# Patient Record
Sex: FEMALE | URBAN - METROPOLITAN AREA
[De-identification: names, ages, dates, MRNs, and addresses within clinical notes are randomized per-mention and may not be internally consistent; named-entity substitution may affect disease eponyms.]

---

## 2019-12-11 PROBLEM — Z00.00 ENCOUNTER FOR PREVENTIVE HEALTH EXAMINATION: Status: ACTIVE | Noted: 2019-12-11

## 2020-07-20 ENCOUNTER — OUTPATIENT (OUTPATIENT)
Dept: OUTPATIENT SERVICES | Facility: HOSPITAL | Age: 40
LOS: 1 days | Discharge: HOME | End: 2020-07-20
Payer: COMMERCIAL

## 2020-07-20 ENCOUNTER — OUTPATIENT (OUTPATIENT)
Dept: OUTPATIENT SERVICES | Facility: HOSPITAL | Age: 40
LOS: 1 days | Discharge: HOME | End: 2020-07-20

## 2020-07-20 VITALS
OXYGEN SATURATION: 100 % | HEIGHT: 66 IN | WEIGHT: 132.06 LBS | RESPIRATION RATE: 16 BRPM | TEMPERATURE: 97 F | DIASTOLIC BLOOD PRESSURE: 78 MMHG | HEART RATE: 68 BPM | SYSTOLIC BLOOD PRESSURE: 132 MMHG

## 2020-07-20 DIAGNOSIS — Z98.890 OTHER SPECIFIED POSTPROCEDURAL STATES: Chronic | ICD-10-CM

## 2020-07-20 DIAGNOSIS — D25.9 LEIOMYOMA OF UTERUS, UNSPECIFIED: ICD-10-CM

## 2020-07-20 DIAGNOSIS — Z01.818 ENCOUNTER FOR OTHER PREPROCEDURAL EXAMINATION: ICD-10-CM

## 2020-07-20 DIAGNOSIS — Z11.59 ENCOUNTER FOR SCREENING FOR OTHER VIRAL DISEASES: ICD-10-CM

## 2020-07-20 LAB
ALBUMIN SERPL ELPH-MCNC: 4.9 G/DL — SIGNIFICANT CHANGE UP (ref 3.5–5.2)
ALP SERPL-CCNC: 56 U/L — SIGNIFICANT CHANGE UP (ref 30–115)
ALT FLD-CCNC: 11 U/L — SIGNIFICANT CHANGE UP (ref 0–41)
ANION GAP SERPL CALC-SCNC: 15 MMOL/L — HIGH (ref 7–14)
APPEARANCE UR: CLEAR — SIGNIFICANT CHANGE UP
APTT BLD: 28.6 SEC — SIGNIFICANT CHANGE UP (ref 27–39.2)
AST SERPL-CCNC: 15 U/L — SIGNIFICANT CHANGE UP (ref 0–41)
BASOPHILS # BLD AUTO: 0.13 K/UL — SIGNIFICANT CHANGE UP (ref 0–0.2)
BASOPHILS NFR BLD AUTO: 2 % — HIGH (ref 0–1)
BILIRUB SERPL-MCNC: 0.3 MG/DL — SIGNIFICANT CHANGE UP (ref 0.2–1.2)
BILIRUB UR-MCNC: NEGATIVE — SIGNIFICANT CHANGE UP
BUN SERPL-MCNC: 10 MG/DL — SIGNIFICANT CHANGE UP (ref 10–20)
CALCIUM SERPL-MCNC: 9.8 MG/DL — SIGNIFICANT CHANGE UP (ref 8.5–10.1)
CHLORIDE SERPL-SCNC: 98 MMOL/L — SIGNIFICANT CHANGE UP (ref 98–110)
CO2 SERPL-SCNC: 27 MMOL/L — SIGNIFICANT CHANGE UP (ref 17–32)
COLOR SPEC: SIGNIFICANT CHANGE UP
CREAT SERPL-MCNC: 0.8 MG/DL — SIGNIFICANT CHANGE UP (ref 0.7–1.5)
DIFF PNL FLD: NEGATIVE — SIGNIFICANT CHANGE UP
EOSINOPHIL # BLD AUTO: 0.17 K/UL — SIGNIFICANT CHANGE UP (ref 0–0.7)
EOSINOPHIL NFR BLD AUTO: 2.6 % — SIGNIFICANT CHANGE UP (ref 0–8)
GLUCOSE SERPL-MCNC: 95 MG/DL — SIGNIFICANT CHANGE UP (ref 70–99)
GLUCOSE UR QL: NEGATIVE — SIGNIFICANT CHANGE UP
HCT VFR BLD CALC: 42.6 % — SIGNIFICANT CHANGE UP (ref 37–47)
HGB BLD-MCNC: 14 G/DL — SIGNIFICANT CHANGE UP (ref 12–16)
IMM GRANULOCYTES NFR BLD AUTO: 0.3 % — SIGNIFICANT CHANGE UP (ref 0.1–0.3)
INR BLD: 0.95 RATIO — SIGNIFICANT CHANGE UP (ref 0.65–1.3)
KETONES UR-MCNC: NEGATIVE — SIGNIFICANT CHANGE UP
LEUKOCYTE ESTERASE UR-ACNC: NEGATIVE — SIGNIFICANT CHANGE UP
LYMPHOCYTES # BLD AUTO: 2.21 K/UL — SIGNIFICANT CHANGE UP (ref 1.2–3.4)
LYMPHOCYTES # BLD AUTO: 33.2 % — SIGNIFICANT CHANGE UP (ref 20.5–51.1)
MCHC RBC-ENTMCNC: 29.4 PG — SIGNIFICANT CHANGE UP (ref 27–31)
MCHC RBC-ENTMCNC: 32.9 G/DL — SIGNIFICANT CHANGE UP (ref 32–37)
MCV RBC AUTO: 89.3 FL — SIGNIFICANT CHANGE UP (ref 81–99)
MONOCYTES # BLD AUTO: 0.65 K/UL — HIGH (ref 0.1–0.6)
MONOCYTES NFR BLD AUTO: 9.8 % — HIGH (ref 1.7–9.3)
NEUTROPHILS # BLD AUTO: 3.47 K/UL — SIGNIFICANT CHANGE UP (ref 1.4–6.5)
NEUTROPHILS NFR BLD AUTO: 52.1 % — SIGNIFICANT CHANGE UP (ref 42.2–75.2)
NITRITE UR-MCNC: NEGATIVE — SIGNIFICANT CHANGE UP
NRBC # BLD: 0 /100 WBCS — SIGNIFICANT CHANGE UP (ref 0–0)
PH UR: 6 — SIGNIFICANT CHANGE UP (ref 5–8)
PLATELET # BLD AUTO: 383 K/UL — SIGNIFICANT CHANGE UP (ref 130–400)
POTASSIUM SERPL-MCNC: 4.2 MMOL/L — SIGNIFICANT CHANGE UP (ref 3.5–5)
POTASSIUM SERPL-SCNC: 4.2 MMOL/L — SIGNIFICANT CHANGE UP (ref 3.5–5)
PROT SERPL-MCNC: 7.5 G/DL — SIGNIFICANT CHANGE UP (ref 6–8)
PROT UR-MCNC: NEGATIVE — SIGNIFICANT CHANGE UP
PROTHROM AB SERPL-ACNC: 10.9 SEC — SIGNIFICANT CHANGE UP (ref 9.95–12.87)
RBC # BLD: 4.77 M/UL — SIGNIFICANT CHANGE UP (ref 4.2–5.4)
RBC # FLD: 11.9 % — SIGNIFICANT CHANGE UP (ref 11.5–14.5)
SODIUM SERPL-SCNC: 140 MMOL/L — SIGNIFICANT CHANGE UP (ref 135–146)
SP GR SPEC: 1.02 — SIGNIFICANT CHANGE UP (ref 1.01–1.02)
UROBILINOGEN FLD QL: SIGNIFICANT CHANGE UP
WBC # BLD: 6.65 K/UL — SIGNIFICANT CHANGE UP (ref 4.8–10.8)
WBC # FLD AUTO: 6.65 K/UL — SIGNIFICANT CHANGE UP (ref 4.8–10.8)

## 2020-07-20 PROCEDURE — 93010 ELECTROCARDIOGRAM REPORT: CPT

## 2020-07-20 NOTE — H&P PST ADULT - NSANTHOSAYNRD_GEN_A_CORE
No. MIHAI screening performed.  STOP BANG Legend: 0-2 = LOW Risk; 3-4 = INTERMEDIATE Risk; 5-8 = HIGH Risk

## 2020-07-20 NOTE — H&P PST ADULT - NSICDXPASTMEDICALHX_GEN_ALL_CORE_FT
PAST MEDICAL HISTORY:  Hypothyroidism due to Hashimoto's thyroiditis 2 yr stable dose > 1 yr    Uterine myoma 5 yr

## 2020-07-20 NOTE — H&P PST ADULT - HISTORY OF PRESENT ILLNESS
39 yr old female to past for abdominal myomectomy possible hysterectomy h/o fibroid uterus  5 yr increase in size no pain menses heavy but regular [id number]   Pt reports no cardiopulmonary issues denies sob/[place]/cp/palpitations. Pt states no recent infections no fever no cough no uti uri. Stated exercise tolerance is 2-3  flights no changes. Michael screen revd.  Pt denies any s/s covid 19 and reports no contact with known postive people. Pt has appointment for repeat covid testing pre op and instructed to continue to self monitor and report any concerns to MD. Pt will continue to practice social distancing and exposure control measures pre op  Anesthesia Alert  NO--Difficult Airway  NO--History of neck surgery or radiation  NO--Limited ROM of neck  NO--History of Malignant hyperthermia  NO--No personal or family history of Pseudocholinesterase deficiency.  NO--Prior Anesthesia Complication  NO--Latex Allergy  NO--Loose teeth  NO--History of Rheumatoid Arthritis  NO--MICHAEL  NO--Other_____   no fam h/o ane issues or bleeding disorders   pmh hypothyroid hashimotos  2 yrs stable dose 1 yr appears clinically euthyroid   psh gyn laparoscopy  2016

## 2020-07-21 LAB
T3 SERPL-MCNC: 78 NG/DL — LOW (ref 80–200)
T4 AB SER-ACNC: 8.1 UG/DL — SIGNIFICANT CHANGE UP (ref 4.6–12)
TSH SERPL-MCNC: 1.75 UIU/ML — SIGNIFICANT CHANGE UP (ref 0.27–4.2)

## 2020-07-23 ENCOUNTER — INPATIENT (INPATIENT)
Facility: HOSPITAL | Age: 40
LOS: 1 days | Discharge: HOME | End: 2020-07-25
Attending: OBSTETRICS & GYNECOLOGY | Admitting: OBSTETRICS & GYNECOLOGY
Payer: COMMERCIAL

## 2020-07-23 VITALS
TEMPERATURE: 98 F | HEIGHT: 66 IN | DIASTOLIC BLOOD PRESSURE: 70 MMHG | WEIGHT: 134.04 LBS | RESPIRATION RATE: 17 BRPM | OXYGEN SATURATION: 99 % | SYSTOLIC BLOOD PRESSURE: 110 MMHG | HEART RATE: 69 BPM

## 2020-07-23 DIAGNOSIS — Z98.890 OTHER SPECIFIED POSTPROCEDURAL STATES: Chronic | ICD-10-CM

## 2020-07-23 DIAGNOSIS — Z90.79 ACQUIRED ABSENCE OF OTHER GENITAL ORGAN(S): Chronic | ICD-10-CM

## 2020-07-23 DIAGNOSIS — O00.201 RIGHT OVARIAN PREGNANCY WITHOUT INTRAUTERINE PREGNANCY: Chronic | ICD-10-CM

## 2020-07-23 LAB
ABO RH CONFIRMATION: SIGNIFICANT CHANGE UP
ALBUMIN SERPL ELPH-MCNC: 4 G/DL — SIGNIFICANT CHANGE UP (ref 3.5–5.2)
ALP SERPL-CCNC: 36 U/L — SIGNIFICANT CHANGE UP (ref 30–115)
ALT FLD-CCNC: 9 U/L — SIGNIFICANT CHANGE UP (ref 0–41)
ANION GAP SERPL CALC-SCNC: 12 MMOL/L — SIGNIFICANT CHANGE UP (ref 7–14)
AST SERPL-CCNC: 15 U/L — SIGNIFICANT CHANGE UP (ref 0–41)
BASOPHILS # BLD AUTO: 0.05 K/UL — SIGNIFICANT CHANGE UP (ref 0–0.2)
BASOPHILS NFR BLD AUTO: 0.3 % — SIGNIFICANT CHANGE UP (ref 0–1)
BILIRUB SERPL-MCNC: 0.5 MG/DL — SIGNIFICANT CHANGE UP (ref 0.2–1.2)
BLD GP AB SCN SERPL QL: SIGNIFICANT CHANGE UP
BUN SERPL-MCNC: 5 MG/DL — LOW (ref 10–20)
CALCIUM SERPL-MCNC: 9.1 MG/DL — SIGNIFICANT CHANGE UP (ref 8.5–10.1)
CHLORIDE SERPL-SCNC: 101 MMOL/L — SIGNIFICANT CHANGE UP (ref 98–110)
CO2 SERPL-SCNC: 25 MMOL/L — SIGNIFICANT CHANGE UP (ref 17–32)
CREAT SERPL-MCNC: 0.6 MG/DL — LOW (ref 0.7–1.5)
EOSINOPHIL # BLD AUTO: 0 K/UL — SIGNIFICANT CHANGE UP (ref 0–0.7)
EOSINOPHIL NFR BLD AUTO: 0 % — SIGNIFICANT CHANGE UP (ref 0–8)
GLUCOSE BLDC GLUCOMTR-MCNC: 100 MG/DL — HIGH (ref 70–99)
GLUCOSE SERPL-MCNC: 151 MG/DL — HIGH (ref 70–99)
HCT VFR BLD CALC: 35.1 % — LOW (ref 37–47)
HGB BLD-MCNC: 11.6 G/DL — LOW (ref 12–16)
IMM GRANULOCYTES NFR BLD AUTO: 0.6 % — HIGH (ref 0.1–0.3)
LYMPHOCYTES # BLD AUTO: 0.68 K/UL — LOW (ref 1.2–3.4)
LYMPHOCYTES # BLD AUTO: 3.7 % — LOW (ref 20.5–51.1)
MAGNESIUM SERPL-MCNC: 1.9 MG/DL — SIGNIFICANT CHANGE UP (ref 1.8–2.4)
MCHC RBC-ENTMCNC: 29.4 PG — SIGNIFICANT CHANGE UP (ref 27–31)
MCHC RBC-ENTMCNC: 33 G/DL — SIGNIFICANT CHANGE UP (ref 32–37)
MCV RBC AUTO: 88.9 FL — SIGNIFICANT CHANGE UP (ref 81–99)
MONOCYTES # BLD AUTO: 1.19 K/UL — HIGH (ref 0.1–0.6)
MONOCYTES NFR BLD AUTO: 6.4 % — SIGNIFICANT CHANGE UP (ref 1.7–9.3)
NEUTROPHILS # BLD AUTO: 16.52 K/UL — HIGH (ref 1.4–6.5)
NEUTROPHILS NFR BLD AUTO: 89 % — HIGH (ref 42.2–75.2)
NRBC # BLD: 0 /100 WBCS — SIGNIFICANT CHANGE UP (ref 0–0)
PHOSPHATE SERPL-MCNC: 3.8 MG/DL — SIGNIFICANT CHANGE UP (ref 2.1–4.9)
PLATELET # BLD AUTO: 313 K/UL — SIGNIFICANT CHANGE UP (ref 130–400)
POTASSIUM SERPL-MCNC: 4.5 MMOL/L — SIGNIFICANT CHANGE UP (ref 3.5–5)
POTASSIUM SERPL-SCNC: 4.5 MMOL/L — SIGNIFICANT CHANGE UP (ref 3.5–5)
PROT SERPL-MCNC: 6.1 G/DL — SIGNIFICANT CHANGE UP (ref 6–8)
RBC # BLD: 3.95 M/UL — LOW (ref 4.2–5.4)
RBC # FLD: 11.9 % — SIGNIFICANT CHANGE UP (ref 11.5–14.5)
SODIUM SERPL-SCNC: 138 MMOL/L — SIGNIFICANT CHANGE UP (ref 135–146)
WBC # BLD: 18.55 K/UL — HIGH (ref 4.8–10.8)
WBC # FLD AUTO: 18.55 K/UL — HIGH (ref 4.8–10.8)

## 2020-07-23 PROCEDURE — 88305 TISSUE EXAM BY PATHOLOGIST: CPT | Mod: 26,59

## 2020-07-23 RX ORDER — ONDANSETRON 8 MG/1
4 TABLET, FILM COATED ORAL ONCE
Refills: 0 | Status: DISCONTINUED | OUTPATIENT
Start: 2020-07-23 | End: 2020-07-25

## 2020-07-23 RX ORDER — SODIUM CHLORIDE 9 MG/ML
1000 INJECTION, SOLUTION INTRAVENOUS
Refills: 0 | Status: DISCONTINUED | OUTPATIENT
Start: 2020-07-23 | End: 2020-07-23

## 2020-07-23 RX ORDER — HYDROMORPHONE HYDROCHLORIDE 2 MG/ML
0.5 INJECTION INTRAMUSCULAR; INTRAVENOUS; SUBCUTANEOUS
Refills: 0 | Status: DISCONTINUED | OUTPATIENT
Start: 2020-07-23 | End: 2020-07-23

## 2020-07-23 RX ORDER — ONDANSETRON 8 MG/1
4 TABLET, FILM COATED ORAL ONCE
Refills: 0 | Status: COMPLETED | OUTPATIENT
Start: 2020-07-23 | End: 2020-07-23

## 2020-07-23 RX ORDER — KETOROLAC TROMETHAMINE 30 MG/ML
15 SYRINGE (ML) INJECTION EVERY 6 HOURS
Refills: 0 | Status: DISCONTINUED | OUTPATIENT
Start: 2020-07-23 | End: 2020-07-24

## 2020-07-23 RX ORDER — MORPHINE SULFATE 50 MG/1
2 CAPSULE, EXTENDED RELEASE ORAL
Refills: 0 | Status: DISCONTINUED | OUTPATIENT
Start: 2020-07-23 | End: 2020-07-23

## 2020-07-23 RX ORDER — SIMETHICONE 80 MG/1
80 TABLET, CHEWABLE ORAL EVERY 4 HOURS
Refills: 0 | Status: DISCONTINUED | OUTPATIENT
Start: 2020-07-23 | End: 2020-07-25

## 2020-07-23 RX ORDER — LEVOTHYROXINE SODIUM 125 MCG
75 TABLET ORAL DAILY
Refills: 0 | Status: DISCONTINUED | OUTPATIENT
Start: 2020-07-23 | End: 2020-07-25

## 2020-07-23 RX ORDER — OXYCODONE HYDROCHLORIDE 5 MG/1
5 TABLET ORAL EVERY 4 HOURS
Refills: 0 | Status: DISCONTINUED | OUTPATIENT
Start: 2020-07-23 | End: 2020-07-25

## 2020-07-23 RX ORDER — IBUPROFEN 200 MG
600 TABLET ORAL EVERY 6 HOURS
Refills: 0 | Status: DISCONTINUED | OUTPATIENT
Start: 2020-07-24 | End: 2020-07-25

## 2020-07-23 RX ORDER — ACETAMINOPHEN 500 MG
1000 TABLET ORAL ONCE
Refills: 0 | Status: COMPLETED | OUTPATIENT
Start: 2020-07-23 | End: 2020-07-23

## 2020-07-23 RX ORDER — ACETAMINOPHEN 500 MG
975 TABLET ORAL EVERY 6 HOURS
Refills: 0 | Status: DISCONTINUED | OUTPATIENT
Start: 2020-07-23 | End: 2020-07-25

## 2020-07-23 RX ORDER — ENOXAPARIN SODIUM 100 MG/ML
40 INJECTION SUBCUTANEOUS AT BEDTIME
Refills: 0 | Status: DISCONTINUED | OUTPATIENT
Start: 2020-07-23 | End: 2020-07-25

## 2020-07-23 RX ADMIN — Medication 15 MILLIGRAM(S): at 23:07

## 2020-07-23 RX ADMIN — OXYCODONE HYDROCHLORIDE 5 MILLIGRAM(S): 5 TABLET ORAL at 18:45

## 2020-07-23 RX ADMIN — SIMETHICONE 80 MILLIGRAM(S): 80 TABLET, CHEWABLE ORAL at 17:29

## 2020-07-23 RX ADMIN — Medication 15 MILLIGRAM(S): at 13:08

## 2020-07-23 RX ADMIN — SIMETHICONE 80 MILLIGRAM(S): 80 TABLET, CHEWABLE ORAL at 13:53

## 2020-07-23 RX ADMIN — Medication 15 MILLIGRAM(S): at 23:14

## 2020-07-23 RX ADMIN — Medication 400 MILLIGRAM(S): at 11:44

## 2020-07-23 RX ADMIN — Medication 5 MILLIGRAM(S): at 21:40

## 2020-07-23 RX ADMIN — Medication 975 MILLIGRAM(S): at 23:13

## 2020-07-23 RX ADMIN — HYDROMORPHONE HYDROCHLORIDE 0.5 MILLIGRAM(S): 2 INJECTION INTRAMUSCULAR; INTRAVENOUS; SUBCUTANEOUS at 11:04

## 2020-07-23 RX ADMIN — OXYCODONE HYDROCHLORIDE 5 MILLIGRAM(S): 5 TABLET ORAL at 13:53

## 2020-07-23 RX ADMIN — Medication 1000 MILLIGRAM(S): at 12:00

## 2020-07-23 RX ADMIN — Medication 600 MILLIGRAM(S): at 23:07

## 2020-07-23 RX ADMIN — Medication 975 MILLIGRAM(S): at 23:07

## 2020-07-23 RX ADMIN — Medication 15 MILLIGRAM(S): at 17:29

## 2020-07-23 RX ADMIN — Medication 600 MILLIGRAM(S): at 23:13

## 2020-07-23 RX ADMIN — Medication 975 MILLIGRAM(S): at 17:29

## 2020-07-23 RX ADMIN — ONDANSETRON 4 MILLIGRAM(S): 8 TABLET, FILM COATED ORAL at 10:58

## 2020-07-23 RX ADMIN — SODIUM CHLORIDE 75 MILLILITER(S): 9 INJECTION, SOLUTION INTRAVENOUS at 10:46

## 2020-07-23 RX ADMIN — SODIUM CHLORIDE 75 MILLILITER(S): 9 INJECTION, SOLUTION INTRAVENOUS at 13:08

## 2020-07-23 RX ADMIN — SIMETHICONE 80 MILLIGRAM(S): 80 TABLET, CHEWABLE ORAL at 21:40

## 2020-07-23 RX ADMIN — HYDROMORPHONE HYDROCHLORIDE 0.5 MILLIGRAM(S): 2 INJECTION INTRAMUSCULAR; INTRAVENOUS; SUBCUTANEOUS at 10:49

## 2020-07-23 RX ADMIN — ENOXAPARIN SODIUM 40 MILLIGRAM(S): 100 INJECTION SUBCUTANEOUS at 21:40

## 2020-07-23 NOTE — ASU PREOP CHECKLIST - SELECT TESTS ORDERED
BMP/CBC/PT/PTT/UCG/EKG BMP/CBC/PT/PTT/Urinalysis/UCG/EKG urine pregnancy negative/BMP/CBC/PT/PTT/Urinalysis/UCG/EKG urine pregnancy negative/BMP/CBC/PT/PTT/Type and Cross/Type and Screen/Urinalysis/UCG/EKG

## 2020-07-23 NOTE — BRIEF OPERATIVE NOTE - OPERATION/FINDINGS
10 week size uterus, 10cm subserosal fibroid, 2cm pedunculated fibroid, normal bilateral ovaries, surgically absent left fallopian tube, normal right fallopian tube

## 2020-07-23 NOTE — PROGRESS NOTE ADULT - SUBJECTIVE AND OBJECTIVE BOX
PGY 2 Note    Patient examined at bedside. Pain  2/10 on lower abdomen. Denies fevers/chills, HA/N/V, CP/SOB/palpitations, vaginal bleeding, hematuria/dysuria, constipation/diarrhea. Tolerating regular diet, no flatus yet. Not Ambulating yet.     T(F): 98.8 (20 @ 17:04), Max: 98.9 (20 @ 10:35)  HR: 101 (20 @ 17:04) (63 - 101)  BP: 89/54 (20 @ 17:04) (89/54 - 110/70)  RR: 18 (20 @ 17:04) (11 - 20)  SpO2: 96% (20 @ 13:15) (96% - 100%)    UO 900cc ([id number])      POCT Blood Glucose.: 100 mg/dL (2020 05:56)      Physical Exam:  General: AAOx3. NAD  CVS: RRR. Nl S1S2  Lungs: CTAB  Abdomen: soft, mildly tender on bilateral lower quadrants, non-distended, +BSx4  Incision: Dressing in place, no saturation.   VE: deferred, no bleeding on pad/chux  Ext: No edema. SCDs in place    Labs:             14.0   6.65  )-----------( 383      (  @ 18:48 )             42.6             Trend:             14.0   6.65  )-----------( 383      (  @ 18:48 )             42.6         Creatinine, Serum: 0.8 ()      Medications:  MEDICATIONS  (STANDING):  acetaminophen   Tablet .. 975 milliGRAM(s) Oral every 6 hours  bisacodyl 5 milliGRAM(s) Oral at bedtime  ketorolac   Injectable 15 milliGRAM(s) IV Push every 6 hours  levothyroxine 75 MICROGram(s) Oral daily  simethicone 80 milliGRAM(s) Chew every 4 hours    MEDICATIONS  (PRN):  ondansetron Injectable 4 milliGRAM(s) IV Push once PRN Nausea and/or Vomiting  oxyCODONE    IR 5 milliGRAM(s) Oral every 4 hours PRN Severe Pain (7 - 10)      A/P:  38yo , s/p abdominal myomectomy for known large fibroid, EBL 300cc, h/o Hashimoto's thyroiditis on Synthroid, recovering well  POD 0  Neuro: AAOx3, NAD, ERAS protocol for pain management  Cardio: vital signs ordered q4hr  Pulm: Incentive spirometry ordered  ID: f/u postop labs @1600 and tomorrow AM  Endo: Synthroid 75mcg QD ordered  GI: regular diet, Zofran ordered PRN  DVT prophylaxis: SCDs in place, Lovenox tonight if 1600 labs stable    Dr. Bustos and Dr. Monterroso aware PGY 2 Note    Patient examined at bedside. Pain  2/10 on lower abdomen. Denies fevers/chills, HA/N/V, CP/SOB/palpitations, vaginal bleeding, hematuria/dysuria, constipation/diarrhea. Tolerating regular diet, no flatus yet. Not Ambulating yet.     T(F): 98.8 (20 @ 17:04), Max: 98.9 (20 @ 10:35)  HR: 101 (20 @ 17:04) (63 - 101)  BP: 89/54 (20 @ 17:04) (89/54 - 110/70)  RR: 18 (20 @ 17:04) (11 - 20)  SpO2: 96% (20 @ 13:15) (96% - 100%)    UO 900cc ([id number])      POCT Blood Glucose.: 100 mg/dL (2020 05:56)      Physical Exam:  General: AAOx3. NAD  CVS: RRR. Nl S1S2  Lungs: CTAB  Abdomen: soft, mildly tender on bilateral lower quadrants, non-distended, +BSx4  Incision: Dressing in place, no saturation.   VE: deferred, no bleeding on pad/chux  Ext: No edema. SCDs in place    Labs:             14.0   6.65  )-----------( 383      (  @ 18:48 )             42.6   (Preop)      Creatinine, Serum: 0.8 ()      Medications:  MEDICATIONS  (STANDING):  acetaminophen   Tablet .. 975 milliGRAM(s) Oral every 6 hours  bisacodyl 5 milliGRAM(s) Oral at bedtime  ketorolac   Injectable 15 milliGRAM(s) IV Push every 6 hours  levothyroxine 75 MICROGram(s) Oral daily  simethicone 80 milliGRAM(s) Chew every 4 hours    MEDICATIONS  (PRN):  ondansetron Injectable 4 milliGRAM(s) IV Push once PRN Nausea and/or Vomiting  oxyCODONE    IR 5 milliGRAM(s) Oral every 4 hours PRN Severe Pain (7 - 10)      A/P:  38yo , s/p abdominal myomectomy for known large fibroid, EBL 300cc, h/o Hashimoto's thyroiditis on Synthroid, recovering well  POD 0  Neuro: AAOx3, NAD, ERAS protocol for pain management  Cardio: vital signs ordered q4hr  Pulm: Incentive spirometry ordered  ID: f/u postop labs @1600 and tomorrow AM  Endo: Synthroid 75mcg QD ordered  GI: regular diet, Zofran ordered PRN  DVT prophylaxis: SCDs in place, Lovenox tonight if 1600 labs stable    Dr. Bustos and Dr. Monterroso aware

## 2020-07-24 LAB
ALBUMIN SERPL ELPH-MCNC: 3.5 G/DL — SIGNIFICANT CHANGE UP (ref 3.5–5.2)
ALP SERPL-CCNC: 33 U/L — SIGNIFICANT CHANGE UP (ref 30–115)
ALT FLD-CCNC: 9 U/L — SIGNIFICANT CHANGE UP (ref 0–41)
ANION GAP SERPL CALC-SCNC: 11 MMOL/L — SIGNIFICANT CHANGE UP (ref 7–14)
AST SERPL-CCNC: 14 U/L — SIGNIFICANT CHANGE UP (ref 0–41)
BASOPHILS # BLD AUTO: 0.09 K/UL — SIGNIFICANT CHANGE UP (ref 0–0.2)
BASOPHILS NFR BLD AUTO: 0.8 % — SIGNIFICANT CHANGE UP (ref 0–1)
BILIRUB SERPL-MCNC: 0.6 MG/DL — SIGNIFICANT CHANGE UP (ref 0.2–1.2)
BUN SERPL-MCNC: 5 MG/DL — LOW (ref 10–20)
CALCIUM SERPL-MCNC: 8.7 MG/DL — SIGNIFICANT CHANGE UP (ref 8.5–10.1)
CHLORIDE SERPL-SCNC: 105 MMOL/L — SIGNIFICANT CHANGE UP (ref 98–110)
CO2 SERPL-SCNC: 24 MMOL/L — SIGNIFICANT CHANGE UP (ref 17–32)
CREAT SERPL-MCNC: 0.6 MG/DL — LOW (ref 0.7–1.5)
EOSINOPHIL # BLD AUTO: 0.05 K/UL — SIGNIFICANT CHANGE UP (ref 0–0.7)
EOSINOPHIL NFR BLD AUTO: 0.5 % — SIGNIFICANT CHANGE UP (ref 0–8)
GLUCOSE SERPL-MCNC: 95 MG/DL — SIGNIFICANT CHANGE UP (ref 70–99)
HCT VFR BLD CALC: 30.7 % — LOW (ref 37–47)
HGB BLD-MCNC: 10.5 G/DL — LOW (ref 12–16)
IMM GRANULOCYTES NFR BLD AUTO: 0.3 % — SIGNIFICANT CHANGE UP (ref 0.1–0.3)
LYMPHOCYTES # BLD AUTO: 19.8 % — LOW (ref 20.5–51.1)
LYMPHOCYTES # BLD AUTO: 2.16 K/UL — SIGNIFICANT CHANGE UP (ref 1.2–3.4)
MAGNESIUM SERPL-MCNC: 2 MG/DL — SIGNIFICANT CHANGE UP (ref 1.8–2.4)
MCHC RBC-ENTMCNC: 31.8 PG — HIGH (ref 27–31)
MCHC RBC-ENTMCNC: 34.2 G/DL — SIGNIFICANT CHANGE UP (ref 32–37)
MCV RBC AUTO: 93 FL — SIGNIFICANT CHANGE UP (ref 81–99)
MONOCYTES # BLD AUTO: 1.58 K/UL — HIGH (ref 0.1–0.6)
MONOCYTES NFR BLD AUTO: 14.5 % — HIGH (ref 1.7–9.3)
NEUTROPHILS # BLD AUTO: 6.99 K/UL — HIGH (ref 1.4–6.5)
NEUTROPHILS NFR BLD AUTO: 64.1 % — SIGNIFICANT CHANGE UP (ref 42.2–75.2)
NRBC # BLD: 0 /100 WBCS — SIGNIFICANT CHANGE UP (ref 0–0)
PHOSPHATE SERPL-MCNC: 3.1 MG/DL — SIGNIFICANT CHANGE UP (ref 2.1–4.9)
PLATELET # BLD AUTO: 252 K/UL — SIGNIFICANT CHANGE UP (ref 130–400)
POTASSIUM SERPL-MCNC: 4 MMOL/L — SIGNIFICANT CHANGE UP (ref 3.5–5)
POTASSIUM SERPL-SCNC: 4 MMOL/L — SIGNIFICANT CHANGE UP (ref 3.5–5)
PROT SERPL-MCNC: 5.6 G/DL — LOW (ref 6–8)
RBC # BLD: 3.3 M/UL — LOW (ref 4.2–5.4)
RBC # FLD: 12.4 % — SIGNIFICANT CHANGE UP (ref 11.5–14.5)
SODIUM SERPL-SCNC: 140 MMOL/L — SIGNIFICANT CHANGE UP (ref 135–146)
WBC # BLD: 10.9 K/UL — HIGH (ref 4.8–10.8)
WBC # FLD AUTO: 10.9 K/UL — HIGH (ref 4.8–10.8)

## 2020-07-24 RX ORDER — SODIUM CHLORIDE 9 MG/ML
500 INJECTION, SOLUTION INTRAVENOUS ONCE
Refills: 0 | Status: COMPLETED | OUTPATIENT
Start: 2020-07-24 | End: 2020-07-24

## 2020-07-24 RX ADMIN — SIMETHICONE 80 MILLIGRAM(S): 80 TABLET, CHEWABLE ORAL at 17:26

## 2020-07-24 RX ADMIN — Medication 600 MILLIGRAM(S): at 05:33

## 2020-07-24 RX ADMIN — Medication 15 MILLIGRAM(S): at 05:33

## 2020-07-24 RX ADMIN — Medication 975 MILLIGRAM(S): at 23:37

## 2020-07-24 RX ADMIN — Medication 600 MILLIGRAM(S): at 23:36

## 2020-07-24 RX ADMIN — Medication 600 MILLIGRAM(S): at 12:39

## 2020-07-24 RX ADMIN — Medication 5 MILLIGRAM(S): at 21:07

## 2020-07-24 RX ADMIN — Medication 975 MILLIGRAM(S): at 18:46

## 2020-07-24 RX ADMIN — Medication 600 MILLIGRAM(S): at 18:47

## 2020-07-24 RX ADMIN — Medication 975 MILLIGRAM(S): at 17:26

## 2020-07-24 RX ADMIN — Medication 75 MICROGRAM(S): at 05:31

## 2020-07-24 RX ADMIN — Medication 600 MILLIGRAM(S): at 05:31

## 2020-07-24 RX ADMIN — SIMETHICONE 80 MILLIGRAM(S): 80 TABLET, CHEWABLE ORAL at 02:10

## 2020-07-24 RX ADMIN — Medication 975 MILLIGRAM(S): at 05:33

## 2020-07-24 RX ADMIN — Medication 975 MILLIGRAM(S): at 12:28

## 2020-07-24 RX ADMIN — Medication 600 MILLIGRAM(S): at 12:29

## 2020-07-24 RX ADMIN — Medication 975 MILLIGRAM(S): at 05:31

## 2020-07-24 RX ADMIN — SIMETHICONE 80 MILLIGRAM(S): 80 TABLET, CHEWABLE ORAL at 14:15

## 2020-07-24 RX ADMIN — SIMETHICONE 80 MILLIGRAM(S): 80 TABLET, CHEWABLE ORAL at 21:07

## 2020-07-24 RX ADMIN — SIMETHICONE 80 MILLIGRAM(S): 80 TABLET, CHEWABLE ORAL at 05:31

## 2020-07-24 RX ADMIN — Medication 975 MILLIGRAM(S): at 12:39

## 2020-07-24 RX ADMIN — ENOXAPARIN SODIUM 40 MILLIGRAM(S): 100 INJECTION SUBCUTANEOUS at 21:08

## 2020-07-24 RX ADMIN — SODIUM CHLORIDE 1000 MILLILITER(S): 9 INJECTION, SOLUTION INTRAVENOUS at 03:35

## 2020-07-24 RX ADMIN — SIMETHICONE 80 MILLIGRAM(S): 80 TABLET, CHEWABLE ORAL at 09:33

## 2020-07-24 RX ADMIN — Medication 600 MILLIGRAM(S): at 23:37

## 2020-07-24 NOTE — PROGRESS NOTE ADULT - SUBJECTIVE AND OBJECTIVE BOX
PGY 2 Note    Patient examined at bedside. Pain  2/10 on lower abdomen. Denies fevers/chills, HA/N/V, CP/SOB/palpitations, vaginal bleeding, hematuria/dysuria, constipation/diarrhea. Tolerating regular diet and passing flatus without difficulty. Not Ambulating yet.      T(F): 98 (20 @ 05:29), Max: 98.9 (20 @ 10:35)  HR: 76 (20 @ 05:29) (63 - 103)  BP: 96/56 (20 @ 05:29) (87/52 - 110/70)  RR: 18 (20 @ 05:29) (11 - 20)  SpO2: 96% (20 @ 13:15) (96% - 100%)    Physical Exam:  General: AAOx3. NAD  CVS: RRR. Nl S1S2  Lungs: CTAB  Abdomen: soft, non-tender, non-distended, +BSx4  Incision: Dressing changed, no saturation. Pfannenstiel incision intact, steri strips C/D/I  VE: deferred, no bleeding on pad/chux  : askew catheter removed at the bedside  Ext: No edema. SCDs in place    Labs:             11.6<L>  18.55<H> )-----------( 313      (  @ 17:30 )             35.1<L>               14.0   6.65  )-----------( 383      (  @ 18:48 )             42.6          138  |  101  |  5<L>  ----------------------------<  151<H>  4.5   |  25  |  0.6<L>    Ca    9.1      2020 17:30  Phos  3.8       Mg     1.9         TPro  6.1  /  Alb  4.0  /  TBili  0.5  /  DBili  x   /  AST  15  /  ALT  9   /  AlkPhos  36              Trend:             11.6<L>  18.55<H> )-----------( 313      (  @ 17:30 )             35.1<L>               14.0   6.65  )-----------( 383      ( -20 @ 18:48 )             42.6         Creatinine, Serum: 0.6 ()  Creatinine, Serum: 0.8 ()      Medications:  MEDICATIONS  (STANDING):  acetaminophen   Tablet .. 975 milliGRAM(s) Oral every 6 hours  bisacodyl 5 milliGRAM(s) Oral at bedtime  enoxaparin Injectable 40 milliGRAM(s) SubCutaneous at bedtime  ibuprofen  Tablet. 600 milliGRAM(s) Oral every 6 hours  levothyroxine 75 MICROGram(s) Oral daily  simethicone 80 milliGRAM(s) Chew every 4 hours    MEDICATIONS  (PRN):  ondansetron Injectable 4 milliGRAM(s) IV Push once PRN Nausea and/or Vomiting  oxyCODONE    IR 5 milliGRAM(s) Oral every 4 hours PRN Severe Pain (7 - 10)      A/P:  40yo , s/p abdominal myomectomy for known large fibroid, EBL 300cc, h/o Hashimoto's thyroiditis on Synthroid, recovering well  POD 1  Neuro: AAOx3, NAD, ERAS protocol for pain management  Cardio: vital signs ordered q4hr  Pulm: Incentive spirometry ordered  ID: f/u postop labs in AM  Endo: Synthroid 75mcg QD ordered  GI: regular diet, Zofran ordered PRN  DVT prophylaxis: SCDs in place, Lovenox ordered    Dr. Bustos and Dr. Monterroso aware PGY 2 Note    Patient examined at bedside. Pain  2/10 on lower abdomen. Denies fevers/chills, HA/N/V, CP/SOB/palpitations, vaginal bleeding, hematuria/dysuria, constipation/diarrhea. Tolerating regular diet and passing flatus without difficulty. Not Ambulating yet.      T(F): 98 (20 @ 05:29), Max: 98.9 (20 @ 10:35)  HR: 76 (20 @ 05:29) (63 - 103)  BP: 96/56 (20 @ 05:29) (87/52 - 110/70)  RR: 18 (20 @ 05:29) (11 - 20)  SpO2: 96% (20 @ 13:15) (96% - 100%)    Physical Exam:  General: AAOx3. NAD  CVS: RRR. Nl S1S2  Lungs: CTAB  Abdomen: soft, non-tender, non-distended, +BSx4  Incision: Dressing changed, no saturation. Pfannenstiel incision intact, steri strips C/D/I  VE: deferred, no bleeding on pad/chux  : askew catheter removed at the bedside  Ext: No edema. SCDs in place    Labs:             11.6<L>  18.55<H> )-----------( 313      (  @ 17:30 )             35.1<L>               14.0   6.65  )-----------( 383      (  @ 18:48 )             42.6          138  |  101  |  5<L>  ----------------------------<  151<H>  4.5   |  25  |  0.6<L>    Ca    9.1      2020 17:30  Phos  3.8       Mg     1.9         TPro  6.1  /  Alb  4.0  /  TBili  0.5  /  DBili  x   /  AST  15  /  ALT  9   /  AlkPhos  36              Trend:             11.6<L>  18.55<H> )-----------( 313      (  @ 17:30 )             35.1<L>               14.0   6.65  )-----------( 383      ( -20 @ 18:48 )             42.6         Creatinine, Serum: 0.6 ()  Creatinine, Serum: 0.8 ()      Medications:  MEDICATIONS  (STANDING):  acetaminophen   Tablet .. 975 milliGRAM(s) Oral every 6 hours  bisacodyl 5 milliGRAM(s) Oral at bedtime  enoxaparin Injectable 40 milliGRAM(s) SubCutaneous at bedtime  ibuprofen  Tablet. 600 milliGRAM(s) Oral every 6 hours  levothyroxine 75 MICROGram(s) Oral daily  simethicone 80 milliGRAM(s) Chew every 4 hours    MEDICATIONS  (PRN):  ondansetron Injectable 4 milliGRAM(s) IV Push once PRN Nausea and/or Vomiting  oxyCODONE    IR 5 milliGRAM(s) Oral every 4 hours PRN Severe Pain (7 - 10)      A/P:  38yo , s/p abdominal myomectomy for known large fibroid, EBL 300cc, h/o Hashimoto's thyroiditis on Synthroid, leukocytosis post-op, afebrile, recovering well  POD 1  Neuro: AAOx3, NAD, ERAS protocol for pain management  Cardio: vital signs ordered q4hr  Pulm: Incentive spirometry at the bedside  ID: f/u postop labs in AM  Endo: Synthroid 75mcg QD ordered  GI: regular diet, Zofran ordered PRN  : askew catheter removed, TOV 1230  DVT prophylaxis: SCDs in place, Lovenox ordered    Dr. Bustos and Dr. Monterroso aware PGY 2 Note    Patient examined at bedside. Pain  2/10 on lower abdomen. Denies fevers/chills, HA/N/V, CP/SOB/palpitations, vaginal bleeding, hematuria/dysuria, constipation/diarrhea. Tolerating regular diet and passing flatus without difficulty. Not Ambulating yet.      T(F): 98 (20 @ 05:29), Max: 98.9 (20 @ 10:35)  HR: 76 (20 @ 05:29) (63 - 103)  BP: 96/56 (20 @ 05:29) (87/52 - 110/70)  RR: 18 (20 @ 05:29) (11 - 20)  SpO2: 96% (20 @ 13:15) (96% - 100%)    Physical Exam:  General: AAOx3. NAD  CVS: RRR. Nl S1S2  Lungs: CTAB  Abdomen: soft, non-tender, non-distended, +BSx4  Incision: Dressing changed, no saturation. Pfannenstiel incision intact, steri strips C/D/I  VE: deferred, no bleeding on pad/chux  : askew catheter removed at the bedside  Ext: No edema. SCDs in place    Labs:             11.6<L>  18.55<H> )-----------( 313      (  @ 17:30 )             35.1<L>               14.0   6.65  )-----------( 383      (  @ 18:48 )             42.6          138  |  101  |  5<L>  ----------------------------<  151<H>  4.5   |  25  |  0.6<L>    Ca    9.1      2020 17:30  Phos  3.8       Mg     1.9         TPro  6.1  /  Alb  4.0  /  TBili  0.5  /  DBili  x   /  AST  15  /  ALT  9   /  AlkPhos  36        Trend:             11.6<L>  18.55<H> )-----------( 313      (  @ 17:30 )             35.1<L>               14.0   6.65  )-----------( 383      ( -20 @ 18:48 )             42.6         Creatinine, Serum: 0.6 ()  Creatinine, Serum: 0.8 ()      Medications:  MEDICATIONS  (STANDING):  acetaminophen   Tablet .. 975 milliGRAM(s) Oral every 6 hours  bisacodyl 5 milliGRAM(s) Oral at bedtime  enoxaparin Injectable 40 milliGRAM(s) SubCutaneous at bedtime  ibuprofen  Tablet. 600 milliGRAM(s) Oral every 6 hours  levothyroxine 75 MICROGram(s) Oral daily  simethicone 80 milliGRAM(s) Chew every 4 hours    MEDICATIONS  (PRN):  ondansetron Injectable 4 milliGRAM(s) IV Push once PRN Nausea and/or Vomiting  oxyCODONE    IR 5 milliGRAM(s) Oral every 4 hours PRN Severe Pain (7 - 10)      A/P:  40yo , s/p abdominal myomectomy for known large fibroid, EBL 300cc, h/o Hashimoto's thyroiditis on Synthroid, leukocytosis post-op, afebrile, recovering well  POD 1  Neuro: AAOx3, NAD, ERAS protocol for pain management  Cardio: vital signs ordered q4hr  Pulm: Incentive spirometry at the bedside  ID: f/u postop labs in AM  Endo: Synthroid 75mcg QD ordered  GI: regular diet, Zofran ordered PRN  : askew catheter removed, TOV 1230  DVT prophylaxis: SCDs in place, Lovenox ordered    Dr. Bustos and Dr. Monterroso aware

## 2020-07-25 ENCOUNTER — TRANSCRIPTION ENCOUNTER (OUTPATIENT)
Age: 40
End: 2020-07-25

## 2020-07-25 VITALS
HEART RATE: 80 BPM | SYSTOLIC BLOOD PRESSURE: 104 MMHG | DIASTOLIC BLOOD PRESSURE: 56 MMHG | TEMPERATURE: 97 F | RESPIRATION RATE: 18 BRPM

## 2020-07-25 RX ORDER — DOCUSATE SODIUM 100 MG
1 CAPSULE ORAL
Qty: 28 | Refills: 0
Start: 2020-07-25 | End: 2020-08-07

## 2020-07-25 RX ORDER — ACETAMINOPHEN 500 MG
2 TABLET ORAL
Qty: 112 | Refills: 0
Start: 2020-07-25 | End: 2020-08-07

## 2020-07-25 RX ORDER — SIMETHICONE 80 MG/1
1 TABLET, CHEWABLE ORAL
Qty: 56 | Refills: 0
Start: 2020-07-25 | End: 2020-08-07

## 2020-07-25 RX ORDER — IBUPROFEN 200 MG
1 TABLET ORAL
Qty: 56 | Refills: 0
Start: 2020-07-25 | End: 2020-08-07

## 2020-07-25 RX ADMIN — Medication 600 MILLIGRAM(S): at 05:18

## 2020-07-25 RX ADMIN — Medication 975 MILLIGRAM(S): at 05:18

## 2020-07-25 RX ADMIN — Medication 975 MILLIGRAM(S): at 11:23

## 2020-07-25 RX ADMIN — SIMETHICONE 80 MILLIGRAM(S): 80 TABLET, CHEWABLE ORAL at 05:18

## 2020-07-25 RX ADMIN — Medication 975 MILLIGRAM(S): at 05:57

## 2020-07-25 RX ADMIN — Medication 600 MILLIGRAM(S): at 05:57

## 2020-07-25 RX ADMIN — Medication 75 MICROGRAM(S): at 05:18

## 2020-07-25 NOTE — DISCHARGE NOTE PROVIDER - NSDCFUADDINST_GEN_ALL_CORE_FT
Nothing in the vagina for 2 weeks (no sex, no tampons, no douching). Avoid tub baths, you may shower.    If you have a fever of 100.4F or greater, severe vaginal bleeding, or severe abdominal pain, call your Ob/Gyn or come to the emergency department immediately.

## 2020-07-25 NOTE — DISCHARGE NOTE PROVIDER - HOSPITAL COURSE
07-25-20 @ 07:58        PAST MEDICAL & SURGICAL HISTORY:    Uterine myoma: 5 yr    Hypothyroidism due to Hashimoto's thyroiditis: 2 yr stable dose &gt; 1 yr    H/O unilateral salpingectomy    Ectopic pregnancy of right ovary    H/O laparoscopy: 2016            COURSE: Pt underwent uncomplicated abdominal myomectomy, EBL 300cc    she had uncomplicated post op course - ambulated, voided, passed flatus, tolerated regular diet    discharged POD2                 LABS:                            10.5     10.90 )-----------( 252      ( 24 Jul 2020 06:46 )               30.7                             11.6     18.55 )-----------( 313      ( 23 Jul 2020 17:30 )               35.1             07-24-20 @ 06:46            140  |  105  |  5<L>    ----------------------------<  95    4.0   |  24  |  0.6<L>        07-23-20 @ 17:30            138  |  101  |  5<L>    ----------------------------<  151<H>    4.5   |  25  |  0.6<L>                Ca    8.7      24 Jul 2020 06:46    Ca    9.1      23 Jul 2020 17:30    Phos  3.1     07-24    Phos  3.8     07-23    Mg     2.0     07-24    Mg     1.9     07-23        TPro  5.6<L>  /  Alb  3.5  /  TBili  0.6  /  DBili  x   /  AST  14  /  ALT  9   /  AlkPhos  33  07-24-20 @ 06:46    TPro  6.1  /  Alb  4.0  /  TBili  0.5  /  DBili  x   /  AST  15  /  ALT  9   /  AlkPhos  36  07-23-20 @ 17:30                Allergies        No Known Allergies        Intolerances

## 2020-07-25 NOTE — DISCHARGE NOTE PROVIDER - NSDCMRMEDTOKEN_GEN_ALL_CORE_FT
Colace 100 mg oral capsule: 1 cap(s) orally 2 times a day   ibuprofen 600 mg oral tablet: 1 tab(s) orally every 6 hours   Multiple Vitamins oral tablet: 1 tab(s) orally once a day  simethicone 80 mg oral tablet: 1 tab(s) orally every 6 hours   Synthroid 75 mcg (0.075 mg) oral tablet: 1 tab(s) orally once a day  Tylenol 325 mg oral tablet: 2 tab(s) orally every 6 hours   vitamin d: orally once a day

## 2020-07-25 NOTE — PROGRESS NOTE ADULT - SUBJECTIVE AND OBJECTIVE BOX
PGY 3 Progress Note    Subjective: Pt seen and examined at bedside. Doing well, pain controlled. No significant VB. Pt is voiding without difficulty, passing flatus and had BM, tolerating regular diet, ambulating. Denies fever, chills, CP, SOB, N/V, LE pain.    Physical exam:    Vital Signs Last 24 Hrs  T(F): 96.4 (25 Jul 2020 05:00), Max: 98.5 (24 Jul 2020 17:00)  HR: 73 (25 Jul 2020 05:00) (72 - 85)  BP: 103/59 (25 Jul 2020 05:00) (88/50 - 103/62)  BP(mean): 75 (24 Jul 2020 21:00) (72 - 75)  RR: 18 (25 Jul 2020 05:00) (18 - 20)  SpO2: 96% (24 Jul 2020 21:00) (96% - 97%)    Gen: NAD  CVS: s1s2, rrr  Lungs: ctab, no r/r/w  Abdomen: Soft, appropriately tender, no distension, +BS  Incision: steris in place, c/d/i  Pelvic: deferred, no VB on pad  Ext: No calf tenderness b/l    Diet: Regular  meds:   acetaminophen   Tablet ..   975 milliGRAM(s) Oral (07-25-20 @ 05:18)   975 milliGRAM(s) Oral (07-24-20 @ 23:37)   975 milliGRAM(s) Oral (07-24-20 @ 17:26)   975 milliGRAM(s) Oral (07-24-20 @ 12:28)    bisacodyl   5 milliGRAM(s) Oral (07-24-20 @ 21:07)    enoxaparin Injectable   40 milliGRAM(s) SubCutaneous (07-24-20 @ 21:08)    ibuprofen  Tablet.   600 milliGRAM(s) Oral (07-25-20 @ 05:18)   600 milliGRAM(s) Oral (07-24-20 @ 23:36)   600 milliGRAM(s) Oral (07-24-20 @ 18:47)   600 milliGRAM(s) Oral (07-24-20 @ 12:29)    levothyroxine   75 MICROGram(s) Oral (07-25-20 @ 05:18)    simethicone   80 milliGRAM(s) Chew (07-25-20 @ 05:18)   80 milliGRAM(s) Chew (07-24-20 @ 21:07)   80 milliGRAM(s) Chew (07-24-20 @ 17:26)   80 milliGRAM(s) Chew (07-24-20 @ 14:15)   80 milliGRAM(s) Chew (07-24-20 @ 09:33)        LABS:                        10.5   10.90 )-----------( 252      ( 24 Jul 2020 06:46 )             30.7                         11.6   18.55 )-----------( 313      ( 23 Jul 2020 17:30 )             35.1       07-24-20 @ 06:46      140  |  105  |  5<L>  ----------------------------<  95  4.0   |  24  |  0.6<L>    07-23-20 @ 17:30      138  |  101  |  5<L>  ----------------------------<  151<H>  4.5   |  25  |  0.6<L>        Ca    8.7      24 Jul 2020 06:46  Ca    9.1      23 Jul 2020 17:30  Phos  3.1     07-24  Phos  3.8     07-23  Mg     2.0     07-24  Mg     1.9     07-23    TPro  5.6<L>  /  Alb  3.5  /  TBili  0.6  /  DBili  x   /  AST  14  /  ALT  9   /  AlkPhos  33  07-24-20 @ 06:46  TPro  6.1  /  Alb  4.0  /  TBili  0.5  /  DBili  x   /  AST  15  /  ALT  9   /  AlkPhos  36  07-23-20 @ 17:30

## 2020-07-25 NOTE — PROGRESS NOTE ADULT - ASSESSMENT
38 yo , hashimotos on synthroid, s/p abdominal myomectomy for fibroid uterus, EBL 300cc, recovering well.    - pain management PRN  - regular diet, PO hydration  - monitor vitals  - lovenox for DVT ppx  - encourage ambulation, incentive spirometry  - c/w synthroid  - dc today    Dr. Monterroso to be made aware.

## 2020-07-25 NOTE — DISCHARGE NOTE PROVIDER - NSDCCPCAREPLAN_GEN_ALL_CORE_FT
PRINCIPAL DISCHARGE DIAGNOSIS  Diagnosis: S/P myomectomy  Assessment and Plan of Treatment: speedy recovey

## 2020-07-25 NOTE — DISCHARGE NOTE NURSING/CASE MANAGEMENT/SOCIAL WORK - PATIENT PORTAL LINK FT
You can access the FollowMyHealth Patient Portal offered by [facility] by registering at the following website: [email or web address]. By joining [facility]’s FollowMyHealth portal, you will also be able to view your health information using other applications (apps) compatible with our system.

## 2020-07-25 NOTE — DISCHARGE NOTE PROVIDER - CARE PROVIDER_API CALL
Shelton Monterroso  OBSTETRICS AND GYNECOLOGY  [address] [place] [id number]  Phone: [phone]  Fax: [phone]  Follow Up Time:

## 2020-07-26 LAB
SURGICAL PATHOLOGY STUDY: SIGNIFICANT CHANGE UP

## 2020-07-28 DIAGNOSIS — Z90.79 ACQUIRED ABSENCE OF OTHER GENITAL ORGAN(S): ICD-10-CM

## 2020-07-28 DIAGNOSIS — Z87.898 PERSONAL HISTORY OF OTHER SPECIFIED CONDITIONS: ICD-10-CM

## 2020-07-28 DIAGNOSIS — D25.2 SUBSEROSAL LEIOMYOMA OF UTERUS: ICD-10-CM

## 2020-07-28 DIAGNOSIS — E06.3 AUTOIMMUNE THYROIDITIS: ICD-10-CM

## 2020-07-28 DIAGNOSIS — D72.829 ELEVATED WHITE BLOOD CELL COUNT, UNSPECIFIED: ICD-10-CM

## 2023-11-02 RX ORDER — LEVOTHYROXINE SODIUM 125 MCG
1 TABLET ORAL
Qty: 0 | Refills: 0 | DISCHARGE

## 2024-12-09 NOTE — H&P PST ADULT - NS PRO PASSIVE SMOKE EXP
Spoke with patient regarding Dr. Harrington's advisement.     Patient reports some SOB and chest tightness periodically following her ablation on 12/4. Writer informs as long as it is not constant SOB and chest pain, that it can be a common part of the healing process for the heart.     Patient also explains her ears hurt bilaterally, and was told at the hospital this can be from anesthesia. Patient reports at time the ear pain is so bad \"it makes [her] want to cry.\" Patient reports she has been taking Tylenol and using a heating pad and it seems to help.     Patient will contact our office if there are any changes to her symptoms, but overall patient states she is feeling better.     Patient is aware of s/s that would be emergency symptoms. Patient is agreeable to the plan and verbalizes understanding.    No
